# Patient Record
Sex: FEMALE | Race: ASIAN | NOT HISPANIC OR LATINO | Employment: OTHER | URBAN - METROPOLITAN AREA
[De-identification: names, ages, dates, MRNs, and addresses within clinical notes are randomized per-mention and may not be internally consistent; named-entity substitution may affect disease eponyms.]

---

## 2017-06-08 ENCOUNTER — HOSPITAL ENCOUNTER (INPATIENT)
Facility: HOSPITAL | Age: 72
LOS: 1 days | Discharge: HOME | DRG: 378 | End: 2017-06-10
Attending: EMERGENCY MEDICINE | Admitting: EMERGENCY MEDICINE
Payer: COMMERCIAL

## 2017-06-08 DIAGNOSIS — R52 PAIN: ICD-10-CM

## 2017-06-08 DIAGNOSIS — K92.2 ACUTE GI BLEEDING: Primary | ICD-10-CM

## 2017-06-08 DIAGNOSIS — D64.9 SYMPTOMATIC ANEMIA: ICD-10-CM

## 2017-06-08 DIAGNOSIS — N17.9 ACUTE ON CHRONIC RENAL FAILURE: ICD-10-CM

## 2017-06-08 DIAGNOSIS — I25.10 CORONARY ARTERY DISEASE WITHOUT ANGINA PECTORIS, UNSPECIFIED VESSEL OR LESION TYPE, UNSPECIFIED WHETHER NATIVE OR TRANSPLANTED HEART: ICD-10-CM

## 2017-06-08 DIAGNOSIS — N18.9 ACUTE ON CHRONIC RENAL FAILURE: ICD-10-CM

## 2017-06-08 DIAGNOSIS — E11.9 TYPE 2 DIABETES MELLITUS WITHOUT COMPLICATION, WITHOUT LONG-TERM CURRENT USE OF INSULIN: ICD-10-CM

## 2017-06-08 PROBLEM — E78.5 HLD (HYPERLIPIDEMIA): Status: ACTIVE | Noted: 2017-06-08

## 2017-06-08 PROBLEM — E07.9 THYROID DISEASE: Status: ACTIVE | Noted: 2017-06-08

## 2017-06-08 PROBLEM — I10 ESSENTIAL HYPERTENSION: Status: ACTIVE | Noted: 2017-06-08

## 2017-06-08 PROBLEM — N28.9 ACUTE ON CHRONIC RENAL INSUFFICIENCY: Status: ACTIVE | Noted: 2017-06-08

## 2017-06-08 LAB
ABO + RH BLD: NORMAL
ALBUMIN SERPL BCP-MCNC: 4 G/DL
ALP SERPL-CCNC: 25 U/L
ALT SERPL W/O P-5'-P-CCNC: 14 U/L
ANION GAP SERPL CALC-SCNC: 8 MMOL/L
APTT BLDCRRT: 26 SEC
AST SERPL-CCNC: 22 U/L
BASOPHILS # BLD AUTO: 0.01 K/UL
BASOPHILS NFR BLD: 0.3 %
BILIRUB SERPL-MCNC: 0.3 MG/DL
BILIRUB UR QL STRIP: NEGATIVE
BLD GP AB SCN CELLS X3 SERPL QL: NORMAL
BNP SERPL-MCNC: 45 PG/ML
BUN SERPL-MCNC: 70 MG/DL
CALCIUM SERPL-MCNC: 10 MG/DL
CHLORIDE SERPL-SCNC: 106 MMOL/L
CLARITY UR: CLEAR
CO2 SERPL-SCNC: 24 MMOL/L
COLOR UR: COLORLESS
CREAT SERPL-MCNC: 2.2 MG/DL
DIFFERENTIAL METHOD: ABNORMAL
EOSINOPHIL # BLD AUTO: 0.1 K/UL
EOSINOPHIL NFR BLD: 2.8 %
ERYTHROCYTE [DISTWIDTH] IN BLOOD BY AUTOMATED COUNT: 14.5 %
EST. GFR  (AFRICAN AMERICAN): 25 ML/MIN/1.73 M^2
EST. GFR  (NON AFRICAN AMERICAN): 22 ML/MIN/1.73 M^2
GLUCOSE SERPL-MCNC: 136 MG/DL
GLUCOSE UR QL STRIP: NEGATIVE
HCT VFR BLD AUTO: 23.7 %
HGB BLD-MCNC: 7.6 G/DL
HGB UR QL STRIP: NEGATIVE
INR PPP: 1
IRON SERPL-MCNC: 159 UG/DL
KETONES UR QL STRIP: NEGATIVE
LEUKOCYTE ESTERASE UR QL STRIP: NEGATIVE
LIPASE SERPL-CCNC: 75 U/L
LYMPHOCYTES # BLD AUTO: 0.9 K/UL
LYMPHOCYTES NFR BLD: 23.9 %
MCH RBC QN AUTO: 31.9 PG
MCHC RBC AUTO-ENTMCNC: 32.1 %
MCV RBC AUTO: 100 FL
MICROSCOPIC COMMENT: NORMAL
MONOCYTES # BLD AUTO: 0.4 K/UL
MONOCYTES NFR BLD: 8.9 %
NEUTROPHILS # BLD AUTO: 2.5 K/UL
NEUTROPHILS NFR BLD: 63.8 %
NITRITE UR QL STRIP: NEGATIVE
PH UR STRIP: 5 [PH] (ref 5–8)
PLATELET # BLD AUTO: 304 K/UL
PMV BLD AUTO: 10.3 FL
POCT GLUCOSE: 153 MG/DL (ref 70–110)
POCT GLUCOSE: 191 MG/DL (ref 70–110)
POTASSIUM SERPL-SCNC: 5.1 MMOL/L
PROT SERPL-MCNC: 7.5 G/DL
PROT UR QL STRIP: NEGATIVE
PROTHROMBIN TIME: 10.3 SEC
RBC # BLD AUTO: 2.38 M/UL
RBC #/AREA URNS HPF: 1 /HPF (ref 0–4)
RETICS/RBC NFR AUTO: 1.9 %
SATURATED IRON: 28 %
SODIUM SERPL-SCNC: 138 MMOL/L
SP GR UR STRIP: 1.01 (ref 1–1.03)
TOTAL IRON BINDING CAPACITY: 558 UG/DL
TRANSFERRIN SERPL-MCNC: 377 MG/DL
TROPONIN I SERPL DL<=0.01 NG/ML-MCNC: 0.01 NG/ML
TSH SERPL DL<=0.005 MIU/L-ACNC: 2.7 UIU/ML
URN SPEC COLLECT METH UR: ABNORMAL
UROBILINOGEN UR STRIP-ACNC: NEGATIVE EU/DL
WBC # BLD AUTO: 3.94 K/UL

## 2017-06-08 PROCEDURE — 84443 ASSAY THYROID STIM HORMONE: CPT

## 2017-06-08 PROCEDURE — 93005 ELECTROCARDIOGRAM TRACING: CPT

## 2017-06-08 PROCEDURE — 85014 HEMATOCRIT: CPT

## 2017-06-08 PROCEDURE — 85018 HEMOGLOBIN: CPT

## 2017-06-08 PROCEDURE — 83690 ASSAY OF LIPASE: CPT

## 2017-06-08 PROCEDURE — 85025 COMPLETE CBC W/AUTO DIFF WBC: CPT

## 2017-06-08 PROCEDURE — 25000003 PHARM REV CODE 250: Performed by: EMERGENCY MEDICINE

## 2017-06-08 PROCEDURE — 25000003 PHARM REV CODE 250: Performed by: PHYSICIAN ASSISTANT

## 2017-06-08 PROCEDURE — 83540 ASSAY OF IRON: CPT

## 2017-06-08 PROCEDURE — 85730 THROMBOPLASTIN TIME PARTIAL: CPT

## 2017-06-08 PROCEDURE — 83880 ASSAY OF NATRIURETIC PEPTIDE: CPT

## 2017-06-08 PROCEDURE — 63600175 PHARM REV CODE 636 W HCPCS: Performed by: EMERGENCY MEDICINE

## 2017-06-08 PROCEDURE — 82962 GLUCOSE BLOOD TEST: CPT

## 2017-06-08 PROCEDURE — 21400001 HC TELEMETRY ROOM

## 2017-06-08 PROCEDURE — 80053 COMPREHEN METABOLIC PANEL: CPT

## 2017-06-08 PROCEDURE — C9113 INJ PANTOPRAZOLE SODIUM, VIA: HCPCS | Performed by: EMERGENCY MEDICINE

## 2017-06-08 PROCEDURE — 96361 HYDRATE IV INFUSION ADD-ON: CPT

## 2017-06-08 PROCEDURE — 99284 EMERGENCY DEPT VISIT MOD MDM: CPT | Mod: 25

## 2017-06-08 PROCEDURE — 85610 PROTHROMBIN TIME: CPT

## 2017-06-08 PROCEDURE — 84484 ASSAY OF TROPONIN QUANT: CPT | Mod: 91

## 2017-06-08 PROCEDURE — 86920 COMPATIBILITY TEST SPIN: CPT

## 2017-06-08 PROCEDURE — 85045 AUTOMATED RETICULOCYTE COUNT: CPT

## 2017-06-08 PROCEDURE — 81000 URINALYSIS NONAUTO W/SCOPE: CPT

## 2017-06-08 PROCEDURE — 86900 BLOOD TYPING SEROLOGIC ABO: CPT

## 2017-06-08 PROCEDURE — G0378 HOSPITAL OBSERVATION PER HR: HCPCS

## 2017-06-08 PROCEDURE — 96374 THER/PROPH/DIAG INJ IV PUSH: CPT

## 2017-06-08 PROCEDURE — 86901 BLOOD TYPING SEROLOGIC RH(D): CPT

## 2017-06-08 PROCEDURE — 84484 ASSAY OF TROPONIN QUANT: CPT

## 2017-06-08 PROCEDURE — 36415 COLL VENOUS BLD VENIPUNCTURE: CPT

## 2017-06-08 RX ORDER — IBUPROFEN 200 MG
16 TABLET ORAL
Status: DISCONTINUED | OUTPATIENT
Start: 2017-06-08 | End: 2017-06-10 | Stop reason: HOSPADM

## 2017-06-08 RX ORDER — IBUPROFEN 200 MG
24 TABLET ORAL
Status: DISCONTINUED | OUTPATIENT
Start: 2017-06-08 | End: 2017-06-10 | Stop reason: HOSPADM

## 2017-06-08 RX ORDER — METOCLOPRAMIDE HYDROCHLORIDE 5 MG/ML
5 INJECTION INTRAMUSCULAR; INTRAVENOUS EVERY 6 HOURS PRN
Status: DISCONTINUED | OUTPATIENT
Start: 2017-06-08 | End: 2017-06-10 | Stop reason: HOSPADM

## 2017-06-08 RX ORDER — PANTOPRAZOLE SODIUM 40 MG/10ML
80 INJECTION, POWDER, LYOPHILIZED, FOR SOLUTION INTRAVENOUS
Status: COMPLETED | OUTPATIENT
Start: 2017-06-08 | End: 2017-06-08

## 2017-06-08 RX ORDER — GLUCAGON 1 MG
1 KIT INJECTION
Status: DISCONTINUED | OUTPATIENT
Start: 2017-06-08 | End: 2017-06-10 | Stop reason: HOSPADM

## 2017-06-08 RX ORDER — SODIUM CHLORIDE 9 MG/ML
INJECTION, SOLUTION INTRAVENOUS CONTINUOUS
Status: DISCONTINUED | OUTPATIENT
Start: 2017-06-08 | End: 2017-06-10 | Stop reason: HOSPADM

## 2017-06-08 RX ORDER — ASPIRIN 81 MG/1
81 TABLET ORAL DAILY
Status: DISCONTINUED | OUTPATIENT
Start: 2017-06-09 | End: 2017-06-10 | Stop reason: HOSPADM

## 2017-06-08 RX ORDER — LEVOTHYROXINE SODIUM 75 UG/1
75 TABLET ORAL DAILY
Status: DISCONTINUED | OUTPATIENT
Start: 2017-06-09 | End: 2017-06-10 | Stop reason: HOSPADM

## 2017-06-08 RX ORDER — CARVEDILOL 6.25 MG/1
6.25 TABLET ORAL 2 TIMES DAILY WITH MEALS
Status: DISCONTINUED | OUTPATIENT
Start: 2017-06-09 | End: 2017-06-10 | Stop reason: HOSPADM

## 2017-06-08 RX ORDER — INSULIN ASPART 100 [IU]/ML
0-5 INJECTION, SOLUTION INTRAVENOUS; SUBCUTANEOUS
Status: DISCONTINUED | OUTPATIENT
Start: 2017-06-08 | End: 2017-06-10 | Stop reason: HOSPADM

## 2017-06-08 RX ORDER — ONDANSETRON 2 MG/ML
4 INJECTION INTRAMUSCULAR; INTRAVENOUS EVERY 8 HOURS PRN
Status: DISCONTINUED | OUTPATIENT
Start: 2017-06-08 | End: 2017-06-10 | Stop reason: HOSPADM

## 2017-06-08 RX ORDER — FERROUS SULFATE 325(65) MG
325 TABLET, DELAYED RELEASE (ENTERIC COATED) ORAL DAILY
Status: DISCONTINUED | OUTPATIENT
Start: 2017-06-09 | End: 2017-06-10 | Stop reason: HOSPADM

## 2017-06-08 RX ORDER — ATORVASTATIN CALCIUM 40 MG/1
40 TABLET, FILM COATED ORAL DAILY
Status: DISCONTINUED | OUTPATIENT
Start: 2017-06-09 | End: 2017-06-10 | Stop reason: HOSPADM

## 2017-06-08 RX ORDER — PREGABALIN 150 MG/1
150 CAPSULE ORAL 2 TIMES DAILY
COMMUNITY

## 2017-06-08 RX ORDER — ACETAMINOPHEN 325 MG/1
650 TABLET ORAL EVERY 6 HOURS PRN
Status: DISCONTINUED | OUTPATIENT
Start: 2017-06-08 | End: 2017-06-10 | Stop reason: HOSPADM

## 2017-06-08 RX ADMIN — PANTOPRAZOLE SODIUM 80 MG: 40 INJECTION, POWDER, FOR SOLUTION INTRAVENOUS at 06:06

## 2017-06-08 RX ADMIN — SODIUM CHLORIDE: 0.9 INJECTION, SOLUTION INTRAVENOUS at 09:06

## 2017-06-08 RX ADMIN — SODIUM CHLORIDE 1000 ML: 0.9 INJECTION, SOLUTION INTRAVENOUS at 06:06

## 2017-06-08 RX ADMIN — DEXTROSE 8 MG/HR: 50 INJECTION, SOLUTION INTRAVENOUS at 11:06

## 2017-06-08 NOTE — ED TRIAGE NOTES
"Pt with dark colored recently - had a "hot flash" on Tuesday - then started yesterday with nausea, fatigue and dizziness - pt on iron supplements  "

## 2017-06-08 NOTE — ED PROVIDER NOTES
"Encounter Date: 6/8/2017    SCRIBE #1 NOTE: I, Melendez Teodora , am scribing for, and in the presence of,  Oral Dumont MD. I have scribed the following portions of the note - Other sections scribed: HPI, ROS.       History     Chief Complaint   Patient presents with    Fatigue     General fatigue, poor appetite X 3 days. Having hot flashes. Family states she is off her diabetic meds because it was lowering her blood sugar too much and her Dr. D/C'd it     Review of patient's allergies indicates:  No Known Allergies  CC: Fatigue; Dark Stool     HPI: This 71 y.o. female with a medical history of Anticoagulant long-term use; Diabetes mellitus; High cholesterol; Hypertension; and Thyroid disease presents to the ED c/o multiple medical complaints. Pt is c/o generalized fatigue, appetite change, and nausea that began 1 year ago with acutely worsening symptoms and hot flashes in the last week. Pt states, "I went to my doctor and was told I am losing blood. Afterwards, I began feeling very tired."  Pt is c/o 9x episodes of dark stool within the last year. Per family, pt is off her DM medication with approval from her PCP due to lowering her blood sugar too much. Pt is unsure if she has had a colonoscopy exam in the past. Denies sore throat, shortness of breath, dysuria, or any other associated symptoms. Pt has no modifying factors. Pt has no prior treatment.       The history is provided by the patient. The history is limited by a language barrier (Japanese). A  was used (Benjamin).     Past Medical History:   Diagnosis Date    Anticoagulant long-term use     Diabetes mellitus     Encounter for blood transfusion     High cholesterol     Hypertension     Thyroid disease      Past Surgical History:   Procedure Laterality Date    APPENDECTOMY      CORONARY ARTERY BYPASS GRAFT       History reviewed. No pertinent family history.  Social History   Substance Use Topics    Smoking status: Former " Smoker     Start date: 1/8/2002    Smokeless tobacco: Not on file    Alcohol use No     Review of Systems   Constitutional: Positive for appetite change and fatigue.   HENT: Negative for sore throat.    Respiratory: Negative for shortness of breath.    Cardiovascular: Negative for chest pain.   Gastrointestinal: Positive for nausea.        (+) 9x episodes of Dark Stool   Genitourinary: Negative for dysuria.   Musculoskeletal: Negative for back pain.   Skin: Negative for rash.   Neurological: Negative for weakness.   Hematological: Does not bruise/bleed easily.   All other systems reviewed and are negative.      Physical Exam     Initial Vitals [06/08/17 1545]   BP Pulse Resp Temp SpO2   (!) 103/54 84 16 98 °F (36.7 °C) 98 %     Physical Exam  Nursing note and vitals reviewed.  Constitutional: Nontoxic appearing elderly female in no obvious distress  HENT:    Head: NC/AT    Eyes: Conjunctivae normal.  (-) scleral icterus.              Mouth/Throat: Dry mucosal membranes   Neck: Neck supple, normal rom.  Cardiovascular: RRR   Pulmonary/Chest: CTAB   Abdomen:  Soft, ND/NT.  (-) CVA tenderness.  Musculoskeletal:  FROM of all major joints. No apparent edema or tenderness.  Neurological: A&O x4.  No acute focal motor deficits.    Skin: Skin is intact, warm and dry.    Psychiatric: normal mood and affect.      ED Course   Procedures  Labs Reviewed   CBC W/ AUTO DIFFERENTIAL - Abnormal; Notable for the following:        Result Value    RBC 2.38 (*)     Hemoglobin 7.6 (*)     Hematocrit 23.7 (*)      (*)     MCH 31.9 (*)     Lymph # 0.9 (*)     All other components within normal limits   COMPREHENSIVE METABOLIC PANEL - Abnormal; Notable for the following:     Glucose 136 (*)     BUN, Bld 70 (*)     Creatinine 2.2 (*)     Alkaline Phosphatase 25 (*)     eGFR if  25 (*)     eGFR if non  22 (*)     All other components within normal limits   LIPASE - Abnormal; Notable for the following:      Lipase 75 (*)     All other components within normal limits   URINALYSIS - Abnormal; Notable for the following:     Color, UA Colorless (*)     All other components within normal limits   IRON AND TIBC - Abnormal; Notable for the following:     Transferrin 377 (*)     TIBC 558 (*)     All other components within normal limits   POCT GLUCOSE - Abnormal; Notable for the following:     POCT Glucose 191 (*)     All other components within normal limits   POCT GLUCOSE - Abnormal; Notable for the following:     POCT Glucose 153 (*)     All other components within normal limits   TROPONIN I   B-TYPE NATRIURETIC PEPTIDE   PROTIME-INR   APTT   TSH   RETICULOCYTES   URINALYSIS MICROSCOPIC   HEMOGLOBIN A1C   OCCULT BLOOD X 1, STOOL   TYPE & SCREEN   POCT GLUCOSE MONITORING CONTINUOUS     EKG Readings: (Independently Interpreted)   Initial Reading: No STEMI.   Normal sinus rhythm, rate 78, normal axis/intervals, no ST/T-wave abnormalities.       X-Rays:   Independently Interpreted Readings:   Chest X-Ray: Normal heart size.  No infiltrates.  No acute abnormalities.     Medical Decision Making:   History:   I obtained history from: someone other than patient.  Old Medical Records: I decided to obtain old medical records.  Old Records Summarized: records from clinic visits and other records.  Independently Interpreted Test(s):   I have ordered and independently interpreted X-rays - see prior notes.  I have ordered and independently interpreted EKG Reading(s) - see prior notes  Clinical Tests:   Lab Tests: Ordered and Reviewed  Radiological Study: Ordered and Reviewed  Medical Tests: Ordered and Reviewed    Differential Diagnosis:   Initial differential diagnosis includes but is not limited to... coagulopathy, lower gi bleed including hemorrhoids (internal vs external), anal fissure, diverticulosis, diverticulitis, AV malformation; upper GI bleeding including gastritis, peptic ulcer disease, esophageal varices.     Additional  Medical Decision Making:   Emergent evaluation of a 71-year-old female with history of diabetes, dyslipidemia, hypothyroidism, and hypertension who presents the emergency department complaining of dark brown/black stool and generalized weakness for the past 3 days.  On exam, she appears well and in no obvious distress.  Her abdomen is soft and nontender.  TINO significant for Hemoccult-positive brown stool.  VS stable.  H&H 7.6/23.7.  Platelet count 304.  Normal coags.  Findings at this time are concerning for likely upper GI bleed.  She has been started on IV Protonix and will be admitted to medicine for further evaluation and management including serial H&Hs as well as urgent GI consultation.              Scribe Attestation:   Scribe #1: I performed the above scribed service and the documentation accurately describes the services I performed. I attest to the accuracy of the note.    Attending Attestation:           Physician Attestation for Scribe:  Physician Attestation Statement for Scribe #1: I, Oral Dumont MD, reviewed documentation, as scribed by Al Araiza in my presence, and it is both accurate and complete.                 ED Course     Clinical Impression:   The primary encounter diagnosis was Acute GI bleeding. Diagnoses of Pain, Symptomatic anemia, and Acute on chronic renal failure were also pertinent to this visit.    Disposition:   Disposition: Admitted       Oral Dumont MD  06/09/17 025

## 2017-06-09 ENCOUNTER — ANESTHESIA (OUTPATIENT)
Dept: ENDOSCOPY | Facility: HOSPITAL | Age: 72
DRG: 378 | End: 2017-06-09
Payer: COMMERCIAL

## 2017-06-09 ENCOUNTER — SURGERY (OUTPATIENT)
Age: 72
End: 2017-06-09

## 2017-06-09 ENCOUNTER — ANESTHESIA EVENT (OUTPATIENT)
Dept: ENDOSCOPY | Facility: HOSPITAL | Age: 72
DRG: 378 | End: 2017-06-09
Payer: COMMERCIAL

## 2017-06-09 LAB
ALBUMIN SERPL BCP-MCNC: 3.3 G/DL
ALP SERPL-CCNC: 18 U/L
ALT SERPL W/O P-5'-P-CCNC: 11 U/L
ANION GAP SERPL CALC-SCNC: 7 MMOL/L
AST SERPL-CCNC: 17 U/L
BASOPHILS # BLD AUTO: 0.01 K/UL
BASOPHILS NFR BLD: 0.3 %
BILIRUB SERPL-MCNC: 0.3 MG/DL
BUN SERPL-MCNC: 51 MG/DL
CALCIUM SERPL-MCNC: 8.7 MG/DL
CHLORIDE SERPL-SCNC: 111 MMOL/L
CO2 SERPL-SCNC: 24 MMOL/L
CREAT SERPL-MCNC: 1.8 MG/DL
DIFFERENTIAL METHOD: ABNORMAL
EOSINOPHIL # BLD AUTO: 0.1 K/UL
EOSINOPHIL NFR BLD: 4.6 %
ERYTHROCYTE [DISTWIDTH] IN BLOOD BY AUTOMATED COUNT: 14.3 %
EST. GFR  (AFRICAN AMERICAN): 32 ML/MIN/1.73 M^2
EST. GFR  (NON AFRICAN AMERICAN): 28 ML/MIN/1.73 M^2
GLUCOSE SERPL-MCNC: 91 MG/DL
HCT VFR BLD AUTO: 20.1 %
HCT VFR BLD AUTO: 21.9 %
HCT VFR BLD AUTO: 22.4 %
HCT VFR BLD AUTO: 30.9 %
HGB BLD-MCNC: 10.5 G/DL
HGB BLD-MCNC: 6.6 G/DL
HGB BLD-MCNC: 7.1 G/DL
HGB BLD-MCNC: 7.2 G/DL
INR PPP: 1
LYMPHOCYTES # BLD AUTO: 1.1 K/UL
LYMPHOCYTES NFR BLD: 35.6 %
MCH RBC QN AUTO: 32.1 PG
MCHC RBC AUTO-ENTMCNC: 32.4 %
MCV RBC AUTO: 99 FL
MONOCYTES # BLD AUTO: 0.5 K/UL
MONOCYTES NFR BLD: 15 %
NEUTROPHILS # BLD AUTO: 1.4 K/UL
NEUTROPHILS NFR BLD: 44.2 %
PLATELET # BLD AUTO: 269 K/UL
PMV BLD AUTO: 10.2 FL
POCT GLUCOSE: 113 MG/DL (ref 70–110)
POCT GLUCOSE: 119 MG/DL (ref 70–110)
POCT GLUCOSE: 130 MG/DL (ref 70–110)
POCT GLUCOSE: 152 MG/DL (ref 70–110)
POTASSIUM SERPL-SCNC: 4.1 MMOL/L
PROT SERPL-MCNC: 6 G/DL
PROTHROMBIN TIME: 10.6 SEC
RBC # BLD AUTO: 2.21 M/UL
SODIUM SERPL-SCNC: 142 MMOL/L
TROPONIN I SERPL DL<=0.01 NG/ML-MCNC: 0.01 NG/ML
TROPONIN I SERPL DL<=0.01 NG/ML-MCNC: 0.01 NG/ML
WBC # BLD AUTO: 3.06 K/UL

## 2017-06-09 PROCEDURE — 88305 TISSUE EXAM BY PATHOLOGIST: CPT | Performed by: PATHOLOGY

## 2017-06-09 PROCEDURE — 88342 IMHCHEM/IMCYTCHM 1ST ANTB: CPT | Mod: 26,,, | Performed by: PATHOLOGY

## 2017-06-09 PROCEDURE — 21400001 HC TELEMETRY ROOM

## 2017-06-09 PROCEDURE — 25000003 PHARM REV CODE 250: Performed by: PHYSICIAN ASSISTANT

## 2017-06-09 PROCEDURE — 85014 HEMATOCRIT: CPT | Mod: 91

## 2017-06-09 PROCEDURE — 37000009 HC ANESTHESIA EA ADD 15 MINS: Performed by: INTERNAL MEDICINE

## 2017-06-09 PROCEDURE — C9113 INJ PANTOPRAZOLE SODIUM, VIA: HCPCS | Performed by: INTERNAL MEDICINE

## 2017-06-09 PROCEDURE — 85018 HEMOGLOBIN: CPT | Mod: 91

## 2017-06-09 PROCEDURE — 25000003 PHARM REV CODE 250: Performed by: EMERGENCY MEDICINE

## 2017-06-09 PROCEDURE — 36430 TRANSFUSION BLD/BLD COMPNT: CPT

## 2017-06-09 PROCEDURE — 88305 TISSUE EXAM BY PATHOLOGIST: CPT | Mod: 26,,, | Performed by: PATHOLOGY

## 2017-06-09 PROCEDURE — 37000008 HC ANESTHESIA 1ST 15 MINUTES: Performed by: INTERNAL MEDICINE

## 2017-06-09 PROCEDURE — 43239 EGD BIOPSY SINGLE/MULTIPLE: CPT | Performed by: INTERNAL MEDICINE

## 2017-06-09 PROCEDURE — 85025 COMPLETE CBC W/AUTO DIFF WBC: CPT

## 2017-06-09 PROCEDURE — 25000003 PHARM REV CODE 250: Performed by: NURSE ANESTHETIST, CERTIFIED REGISTERED

## 2017-06-09 PROCEDURE — 80053 COMPREHEN METABOLIC PANEL: CPT

## 2017-06-09 PROCEDURE — P9021 RED BLOOD CELLS UNIT: HCPCS

## 2017-06-09 PROCEDURE — 36415 COLL VENOUS BLD VENIPUNCTURE: CPT

## 2017-06-09 PROCEDURE — 82962 GLUCOSE BLOOD TEST: CPT

## 2017-06-09 PROCEDURE — 63600175 PHARM REV CODE 636 W HCPCS: Performed by: NURSE ANESTHETIST, CERTIFIED REGISTERED

## 2017-06-09 PROCEDURE — 63600175 PHARM REV CODE 636 W HCPCS: Performed by: EMERGENCY MEDICINE

## 2017-06-09 PROCEDURE — D9220A PRA ANESTHESIA: Mod: ,,, | Performed by: ANESTHESIOLOGY

## 2017-06-09 PROCEDURE — 63600175 PHARM REV CODE 636 W HCPCS: Performed by: INTERNAL MEDICINE

## 2017-06-09 PROCEDURE — 27201012 HC FORCEPS, HOT/COLD, DISP: Performed by: INTERNAL MEDICINE

## 2017-06-09 PROCEDURE — 85610 PROTHROMBIN TIME: CPT

## 2017-06-09 PROCEDURE — 84484 ASSAY OF TROPONIN QUANT: CPT

## 2017-06-09 PROCEDURE — 0DB68ZX EXCISION OF STOMACH, VIA NATURAL OR ARTIFICIAL OPENING ENDOSCOPIC, DIAGNOSTIC: ICD-10-PCS | Performed by: INTERNAL MEDICINE

## 2017-06-09 PROCEDURE — C9113 INJ PANTOPRAZOLE SODIUM, VIA: HCPCS | Performed by: EMERGENCY MEDICINE

## 2017-06-09 RX ORDER — LIDOCAINE HYDROCHLORIDE 20 MG/ML
INJECTION, SOLUTION EPIDURAL; INFILTRATION; INTRACAUDAL; PERINEURAL
Status: DISPENSED
Start: 2017-06-09 | End: 2017-06-10

## 2017-06-09 RX ORDER — PANTOPRAZOLE SODIUM 40 MG/10ML
40 INJECTION, POWDER, LYOPHILIZED, FOR SOLUTION INTRAVENOUS DAILY
Status: DISCONTINUED | OUTPATIENT
Start: 2017-06-09 | End: 2017-06-10

## 2017-06-09 RX ORDER — LIDOCAINE HCL/PF 100 MG/5ML
SYRINGE (ML) INTRAVENOUS
Status: DISCONTINUED | OUTPATIENT
Start: 2017-06-09 | End: 2017-06-09

## 2017-06-09 RX ORDER — PROPOFOL 10 MG/ML
VIAL (ML) INTRAVENOUS
Status: DISPENSED
Start: 2017-06-09 | End: 2017-06-10

## 2017-06-09 RX ORDER — HYDROCODONE BITARTRATE AND ACETAMINOPHEN 500; 5 MG/1; MG/1
TABLET ORAL
Status: DISCONTINUED | OUTPATIENT
Start: 2017-06-09 | End: 2017-06-10 | Stop reason: HOSPADM

## 2017-06-09 RX ORDER — PROPOFOL 10 MG/ML
VIAL (ML) INTRAVENOUS
Status: DISCONTINUED | OUTPATIENT
Start: 2017-06-09 | End: 2017-06-09

## 2017-06-09 RX ADMIN — CARVEDILOL 6.25 MG: 6.25 TABLET, FILM COATED ORAL at 05:06

## 2017-06-09 RX ADMIN — LIDOCAINE HYDROCHLORIDE 60 MG: 20 INJECTION, SOLUTION INTRAVENOUS at 02:06

## 2017-06-09 RX ADMIN — PROPOFOL 20 MG: 10 INJECTION, EMULSION INTRAVENOUS at 02:06

## 2017-06-09 RX ADMIN — PROPOFOL 50 MG: 10 INJECTION, EMULSION INTRAVENOUS at 02:06

## 2017-06-09 RX ADMIN — ASPIRIN 81 MG: 81 TABLET, COATED ORAL at 09:06

## 2017-06-09 RX ADMIN — DEXTROSE 8 MG/HR: 50 INJECTION, SOLUTION INTRAVENOUS at 09:06

## 2017-06-09 RX ADMIN — FERROUS SULFATE TAB EC 325 MG (65 MG FE EQUIVALENT) 325 MG: 325 (65 FE) TABLET DELAYED RESPONSE at 09:06

## 2017-06-09 RX ADMIN — SODIUM CHLORIDE: 0.9 INJECTION, SOLUTION INTRAVENOUS at 09:06

## 2017-06-09 RX ADMIN — LEVOTHYROXINE SODIUM 75 MCG: 75 TABLET ORAL at 06:06

## 2017-06-09 RX ADMIN — PANTOPRAZOLE SODIUM 40 MG: 40 INJECTION, POWDER, FOR SOLUTION INTRAVENOUS at 05:06

## 2017-06-09 RX ADMIN — ATORVASTATIN CALCIUM 40 MG: 40 TABLET, FILM COATED ORAL at 09:06

## 2017-06-09 RX ADMIN — SODIUM CHLORIDE: 0.9 INJECTION, SOLUTION INTRAVENOUS at 08:06

## 2017-06-09 RX ADMIN — DEXTROSE 8 MG/HR: 50 INJECTION, SOLUTION INTRAVENOUS at 02:06

## 2017-06-09 NOTE — SUBJECTIVE & OBJECTIVE
Past Medical History:   Diagnosis Date    Anticoagulant long-term use     Diabetes mellitus     High cholesterol     Hypertension     Thyroid disease        History reviewed. No pertinent surgical history.    Review of patient's allergies indicates:  No Known Allergies    No current facility-administered medications on file prior to encounter.      Current Outpatient Prescriptions on File Prior to Encounter   Medication Sig    amlodipine (NORVASC) 10 MG tablet Take 10 mg by mouth once daily.    aspirin (ECOTRIN) 81 MG EC tablet Take 81 mg by mouth once daily.    atorvastatin (LIPITOR) 40 MG tablet Take 40 mg by mouth once daily.    carvedilol (COREG) 6.25 MG tablet Take 6.25 mg by mouth 2 (two) times daily with meals.    clopidogrel (PLAVIX) 75 mg tablet Take 75 mg by mouth once daily.    famotidine (PEPCID) 20 MG tablet Take 20 mg by mouth once daily.     furosemide (LASIX) 20 MG tablet Take 1 tablet (20 mg total) by mouth once daily.    levothyroxine (SYNTHROID) 75 MCG tablet Take 75 mcg by mouth once daily.    lisinopril 10 MG tablet Take 10 mg by mouth once daily.    pioglitazone (ACTOS) 30 MG tablet Take 30 mg by mouth once daily.    potassium chloride (KLOR-CON) 20 mEq Pack Take 10 mEq by mouth once daily.     alendronate (FOSAMAX) 70 MG tablet Take 70 mg by mouth every 7 days.    fenofibrate (TRICOR) 145 MG tablet Take 145 mg by mouth once daily.    ferrous sulfate 325 mg (65 mg iron) Tab tablet Take 1 tablet (325 mg total) by mouth once daily.    gabapentin (NEURONTIN) 100 MG capsule Take 100 mg by mouth 3 (three) times daily.    metformin (GLUCOPHAGE) 850 MG tablet Take 850 mg by mouth 2 (two) times daily with meals.    pravastatin (PRAVACHOL) 80 MG tablet Take 80 mg by mouth once daily.    sennosides (SENNA) 8.6 mg Cap Take 1 capsule by mouth once daily.     Family History     None        Social History Main Topics    Smoking status: Unknown If Ever Smoked    Smokeless tobacco: Not  on file    Alcohol use No    Drug use: Unknown    Sexual activity: Not on file     Review of Systems   Constitutional: Positive for appetite change and fatigue. Negative for chills and fever.   HENT: Negative for congestion and sore throat.    Respiratory: Negative for cough and shortness of breath.    Cardiovascular: Negative for chest pain and palpitations.   Gastrointestinal: Positive for blood in stool. Negative for abdominal pain and nausea.   Endocrine: Negative for cold intolerance and heat intolerance.   Genitourinary: Negative for dysuria and frequency.   Musculoskeletal: Negative for arthralgias and myalgias.   Skin: Negative for color change and rash.   Neurological: Positive for weakness and light-headedness. Negative for dizziness and headaches.   Psychiatric/Behavioral: Negative for behavioral problems and confusion.     Objective:     Vital Signs (Most Recent):  Temp: 98 °F (36.7 °C) (06/08/17 1902)  Pulse: 72 (06/08/17 2102)  Resp: 16 (06/08/17 1902)  BP: (!) 120/59 (06/08/17 2102)  SpO2: 100 % (06/08/17 2102) Vital Signs (24h Range):  Temp:  [97.8 °F (36.6 °C)-98 °F (36.7 °C)] 98 °F (36.7 °C)  Pulse:  [70-84] 72  Resp:  [16] 16  SpO2:  [98 %-100 %] 100 %  BP: (103-137)/(53-64) 120/59     Weight: 54.4 kg (120 lb)  Body mass index is 20.6 kg/m².    Physical Exam   Constitutional: She is oriented to person, place, and time. She appears well-developed and well-nourished. No distress.   HENT:   Head: Normocephalic and atraumatic.   Eyes: EOM are normal. Pupils are equal, round, and reactive to light.   Pale conjunctiva   Neck: Normal range of motion. Neck supple.   Cardiovascular: Normal rate and regular rhythm.    No murmur heard.  Pulmonary/Chest: Effort normal and breath sounds normal.   Abdominal: Soft. Bowel sounds are normal. There is no tenderness.   Musculoskeletal: Normal range of motion.   Neurological: She is alert and oriented to person, place, and time.   Skin: Skin is warm and dry. No  rash noted.   Psychiatric: She has a normal mood and affect. Her behavior is normal.        Significant Labs:   CBC:   Recent Labs  Lab 06/08/17  1700   WBC 3.94   HGB 7.6*   HCT 23.7*        CMP:   Recent Labs  Lab 06/08/17  1700      K 5.1      CO2 24   *   BUN 70*   CREATININE 2.2*   CALCIUM 10.0   PROT 7.5   ALBUMIN 4.0   BILITOT 0.3   ALKPHOS 25*   AST 22   ALT 14   ANIONGAP 8   EGFRNONAA 22*     Urine Studies:   Recent Labs  Lab 06/08/17  1800   COLORU Colorless*   APPEARANCEUA Clear   PHUR 5.0   SPECGRAV 1.010   PROTEINUA Negative   GLUCUA Negative   KETONESU Negative   BILIRUBINUA Negative   OCCULTUA Negative   NITRITE Negative   UROBILINOGEN Negative   LEUKOCYTESUR Negative   RBCUA 1

## 2017-06-09 NOTE — ASSESSMENT & PLAN NOTE
-Monitor serial H/H.   -Awaiting Consult to GI  -Transfuse 3 units PRBC  -IV fluids  -iron daily

## 2017-06-09 NOTE — HOSPITAL COURSE
Admitted for weakness and worsening fatigue and found to be anemic with H/H trending downward 6.6/20.1 this morning. Patient consented via translation line for 3 units PRBC, risk and benefits discussed and agreed by patient. She tells me she is familiar with blood transfusions as she has had many before but unsure of the date of the last transfusion.     Dc h/h 12/35  EGD findings: Impression:          - Normal esophagus.                       - Normal examined duodenum.                       - Gastric erosions without bleeding. Biopsied.    Pt instructed to start protonix daily.  Per son she plans to follow up with GI in [place], recommend pill endoscopy. Taken off aspirin and will continue plavix for CAD. Hold metformin. Lower lisinopril and dc norvasc. Changes reviewed with son.

## 2017-06-09 NOTE — ASSESSMENT & PLAN NOTE
Per patient visit 1 year ago seems likely some baseline renal disease although patient is unsure of this. She seems to have acute insufficiency at this time noting creatinine of 2.2 on today and was 1.6 last year. Will provide IVF hydration and repeat BMP in am.

## 2017-06-09 NOTE — ASSESSMENT & PLAN NOTE
CAD s/p CABG. Currently on ASA, plavix, statin it seems although patient unsure what medications she is actually taking. Continue.

## 2017-06-09 NOTE — ED NOTES
Patient's son given permission to give the patient food from home, rice and vegetables. Patient's son instructed that the patient cannot eat after midnight.

## 2017-06-09 NOTE — ANESTHESIA PREPROCEDURE EVALUATION
06/09/2017  Radha Freedman is a 71 y.o., female.    Anesthesia Evaluation    I have reviewed the Patient Summary Reports.     I have reviewed the Medications.     Review of Systems  Anesthesia Hx:  No problems with previous Anesthesia    Social:  Former Smoker, No Alcohol Use    Cardiovascular:   Hypertension CAD  CABG/stent  hyperlipidemia    Renal/:   Chronic Renal Disease, CRI, ARF    Endocrine:   Diabetes Hypothyroidism        Physical Exam  General:  Well nourished    Airway/Jaw/Neck:  Airway Findings: Mouth Opening: Normal Tongue: Normal  General Airway Assessment: Adult  Mallampati: III  TM Distance: Normal, at least 6 cm  Jaw/Neck Findings:  Neck ROM: Normal ROM      Dental:  Dental Findings: In tact   Chest/Lungs:  Chest/Lungs Findings: Clear to auscultation, Normal Respiratory Rate     Heart/Vascular:  Heart Findings: Rate: Normal        Mental Status:  Mental Status Findings:  Cooperative, Alert and Oriented         Anesthesia Plan  Type of Anesthesia, risks & benefits discussed:  Anesthesia Type:  general  Patient's Preference:   Intra-op Monitoring Plan: standard ASA monitors  Intra-op Monitoring Plan Comments:   Post Op Pain Control Plan:   Post Op Pain Control Plan Comments:   Induction:   IV  Beta Blocker:  Patient is on a Beta-Blocker and has not received dose within the past 24 hours due to non-compliance or for other reasons (Patient should receive a perioperative dose or document why it is withheld). Perioperative Beta Blocker not given due to: Hypotension on presentation      Informed Consent: Patient understands risks and agrees with Anesthesia plan.  Questions answered. Anesthesia consent signed with patient.  ASA Score: 3     Day of Surgery Review of History & Physical:    H&P update referred to the surgeon.         Ready For Surgery From Anesthesia Perspective.

## 2017-06-09 NOTE — ASSESSMENT & PLAN NOTE
CAD s/p CABG. Currently on ASA, plavix, statin it seems although patient unsure what medications she is actually taking.  -Continue ASA & Plavix and defer to GI

## 2017-06-09 NOTE — PLAN OF CARE
Problem: Patient Care Overview  Goal: Plan of Care Review   06/08/17 7268   Coping/Psychosocial   Plan Of Care Reviewed With patient;son   Pt remained free of falls during current shift. Denied pain and did not receive any prn pain medications. Pt remained NPO since midnight and GI consulted. IV fluids and IV Protonix infusing per MD order. No signs or symptoms of bleeding present and monitoring for signs/ symptoms of bleeding. Plan of care and fall precautions reviewed with pt and verbalized understanding. Bed locked, lowered, SR up x2 and call light placed within reach.

## 2017-06-09 NOTE — ASSESSMENT & PLAN NOTE
Patient seems to have underlying chronic anemia --iron deficiency vs chronic disease? As she has iron pills in bag but she is unsure of this as well. Last year hemoglobin of 8.1 and today at 7.6 with reported chronic intermittent dark stools? She reports fatigue and SOB but currently vitals stable and no indication for transfusion. She does report having had 2 units of blood in [place] a few months ago. Will monitor with serial H/H and anemia workup pending.

## 2017-06-09 NOTE — HPI
"Patient is 72 yo Yakut speaking female with HTN, HLD, CAD s/p CABG (10 years ago), DM II, CKD?, thyroid disease who presents with complaint of fatigue and dark stools. History obtained with  but patient is poor historian and no family members at bedside. Per patient she has been experiencing about one year of progressively worsening fatigue, decreased appetite, and intermittent dark stools. Per patient she experienced three dark stools this week and had a similar episode a few months ago in [place] for which she received "2 bags of blood." She is unclear on if she has ever had a colonoscopy. She denies recent illness, fever/chills, abdominal pain, hematemesis, hematuria. She does not know much about her medical history or medications. She states she lives in [place] and is here visiting family with plans to return home soon. On presentation patient with hypotension and clinically dehydrated. Otherwise stable vitals. Grossly normal labs outside of hemoglobin of 7.6 (only down from 8.1 last year). Placed in observation for serial H/H and further monitoring.   "

## 2017-06-09 NOTE — PLAN OF CARE
06/09/17 1606   Discharge Assessment   Assessment Type Discharge Planning Assessment   Confirmed/corrected address and phone number on facesheet? Yes   Assessment information obtained from? Patient   Expected Length of Stay (days) 2   Communicated expected length of stay with patient/caregiver yes   Prior to hospitilization cognitive status: Alert/Oriented   Prior to hospitalization functional status: Independent   Current cognitive status: Alert/Oriented   Current Functional Status: Independent   Arrived From home or self-care   Lives With child(rhona), adult   Able to Return to Prior Arrangements yes   Is patient able to care for self after discharge? Unable to determine at this time (comments)   Who are your caregiver(s) and their phone number(s)? Multi family members able to help at home if needed   Patient's perception of discharge disposition home or selfcare   Readmission Within The Last 30 Days no previous admission in last 30 days   Patient currently being followed by outpatient case management? No   Patient currently receives home health services? No   Does the patient currently use HME? No   Equipment Currently Used at Home none   Do you have any problems affording any of your prescribed medications? No   Is the patient taking medications as prescribed? yes   Does the patient have transportation to healthcare appointments? Yes   Transportation Available family or friend will provide   On Dialysis? No   Does the patient receive services at the Coumadin Clinic? No   Discharge Plan A Home with family   Patient/Family In Agreement With Plan yes   Above information obtained from patient at bedside using language line in presences of ANDI Mclean.    TN to patient's room to discuss Helping the patient manage care at home.   TN/SW roll explained to pt.  Teach back method used.  TN's name and contact info placed on white board.  Pt/family encouraged to call for any problems/concerns with DC.

## 2017-06-09 NOTE — SUBJECTIVE & OBJECTIVE
Interval History: Patient weak, fatigued appearing, tells me she has had transfusions before; endorses that she does not eat meat or much with iron nutritionally; denies overt bleeding, denies dark stools, denies vaginal bleeding, or NV    Review of Systems   Constitutional: Positive for appetite change and fatigue. Negative for chills, diaphoresis and fever.   Respiratory: Positive for shortness of breath. Negative for cough, chest tightness and wheezing.    Cardiovascular: Negative for chest pain, palpitations and leg swelling.   Gastrointestinal: Negative for abdominal distention, abdominal pain, constipation, diarrhea, nausea and vomiting.   Genitourinary: Negative for dysuria and hematuria.   Musculoskeletal: Negative for joint swelling and neck stiffness.   Neurological: Positive for dizziness and weakness.     Objective:     Vital Signs (Most Recent):  Temp: 98 °F (36.7 °C) (06/09/17 1130)  Pulse: 66 (06/09/17 1130)  Resp: 17 (06/09/17 1130)  BP: (!) 138/56 (06/09/17 1130)  SpO2: 98 % (06/09/17 1130) Vital Signs (24h Range):  Temp:  [97.7 °F (36.5 °C)-98 °F (36.7 °C)] 98 °F (36.7 °C)  Pulse:  [65-84] 66  Resp:  [16-17] 17  SpO2:  [97 %-100 %] 98 %  BP: (103-148)/(50-64) 138/56     Weight: 58.5 kg (129 lb)  Body mass index is 22.14 kg/m².  No intake or output data in the 24 hours ending 06/09/17 1207   Physical Exam   Constitutional: She is oriented to person, place, and time. She appears well-developed and well-nourished. No distress.   Weak and dehydrated appearing; denies recent weight changes;   HENT:   Head: Normocephalic and atraumatic.   Pale conjunctiva   Eyes: EOM are normal. Pupils are equal, round, and reactive to light. Right eye exhibits no discharge. Left eye exhibits no discharge. No scleral icterus.   Cardiovascular: Normal rate and regular rhythm.    Sluggish cap refill   Pulmonary/Chest: Effort normal. No respiratory distress. She has no wheezes. She has no rales.   Abdominal: Soft. She  exhibits no distension. There is no tenderness.   Genitourinary: Rectal exam shows guaiac negative stool.   Musculoskeletal: Normal range of motion. She exhibits no edema or tenderness.   Neurological: She is alert and oriented to person, place, and time.   Skin: Skin is warm and dry. She is not diaphoretic.   Psychiatric:   Cambodian speaking       Significant Labs:   CBC:   Recent Labs  Lab 06/08/17  1700 06/08/17  2343 06/09/17  0518 06/09/17  0937   WBC 3.94  --  3.06*  --    HGB 7.6* 7.2* 7.1* 6.6*   HCT 23.7* 22.4* 21.9* 20.1*     --  269  --      CMP:   Recent Labs  Lab 06/08/17  1700 06/09/17  0519    142   K 5.1 4.1    111*   CO2 24 24   * 91   BUN 70* 51*   CREATININE 2.2* 1.8*   CALCIUM 10.0 8.7   PROT 7.5 6.0   ALBUMIN 4.0 3.3*   BILITOT 0.3 0.3   ALKPHOS 25* 18*   AST 22 17   ALT 14 11   ANIONGAP 8 7*   EGFRNONAA 22* 28*     Lactic Acid: No results for input(s): LACTATE in the last 48 hours.  Lipase:   Recent Labs  Lab 06/08/17  1700   LIPASE 75*     Lipid Panel: No results for input(s): CHOL, HDL, LDLCALC, TRIG, CHOLHDL in the last 48 hours.  Troponin:   Recent Labs  Lab 06/08/17  1700 06/08/17  2343 06/09/17  0519   TROPONINI 0.012 0.009 0.011     TSH:   Recent Labs  Lab 06/08/17  1800   TSH 2.696     Urine Culture: No results for input(s): LABURIN in the last 48 hours.    Magnesium:  No results for input(s): MG in the last 48 hours.    Significant Imaging:   Imaging Results          X-Ray Chest PA And Lateral (Final result)  Result time 06/08/17 16:59:33    Final result by Sven Sloan MD (06/08/17 16:59:33)                 Impression:     stable chest.      Electronically signed by: RONDA SLOAN MD  Date:     06/08/17  Time:    16:59              Narrative:    2 views of chest.  Comparison 2015.  Postop sternotomy.  Heart normal size.  Prominent epicardial fat pads stable.  Postop cholecystectomy.  Lungs clear.

## 2017-06-09 NOTE — CONSULTS
"Gastroenterology    CC: Anemia/dark stool    HPI 71 y.o. female with moderate severity, painless, persistent, normocytic anemia with dark stools over last several weeks/months.   No hematochezia.  No N/V.  No jaundice.  No recent travel or sick contacts.  No change in bowel habits.  No dysphagia or odynophagia. No heavy NSAID use       Past Medical History:   Diagnosis Date    Anticoagulant long-term use     Diabetes mellitus     Encounter for blood transfusion     High cholesterol     Hypertension     Thyroid disease          Past Surgical History:   Procedure Laterality Date    APPENDECTOMY      CORONARY ARTERY BYPASS GRAFT         Social History  Social History   Substance Use Topics    Smoking status: Former Smoker     Start date: 1/8/2002    Smokeless tobacco: Not on file    Alcohol use No   No illicits    No FH colon cancer    Review of Systems  General ROS: negative for chills, fever or weight loss  Psychological ROS: negative for hallucination, depression or suicidal ideation  Ophthalmic ROS: negative for blurry vision, photophobia or eye pain  ENT ROS: negative for epistaxis, sore throat or rhinorrhea  Respiratory ROS: no cough, wheezing  Cardiovascular ROS: no chest pain or palpitations  Gastrointestinal ROS: no abdominal pain, change in bowel habits  Genito-Urinary ROS: no dysuria, trouble voiding, or hematuria  Musculoskeletal ROS: negative for gait disturbance or muscular weakness  Neurological ROS: no syncope or seizures; no ataxia  Dermatological ROS: negative for pruritis, rash and jaundice    Physical Examination  BP (!) 133/59   Pulse 62   Temp 97.8 °F (36.6 °C) (Oral)   Resp 18   Ht 5' 4" (1.626 m)   Wt 58.5 kg (129 lb)   SpO2 98%   Breastfeeding? No   BMI 22.14 kg/m²   General appearance: alert, cooperative, no distress  HENT: Normocephalic, atraumatic, neck symmetrical, no nasal discharge   Eyes: conjunctivae/corneas clear, PERRL, EOM's intact  Lungs: clear to auscultation " bilaterally, no dullness to percussion bilaterally  Heart: regular rate and rhythm without rub; no displacement of the PMI   Abdomen: soft, non-tender; bowel sounds normoactive; no organomegaly  Extremities: extremities symmetric; no clubbing, cyanosis, or edema  Integument: Skin color, texture, turgor normal; no rashes; hair distrubution normal  Neurologic: Alert and oriented X 3, MAEW  Psychiatric: no pressured speech; normal affect; no evidence of impaired cognition     Labs:  Hb low  MCV high normal    Assessment:     Anemia with dark stools over last weeks/months.  Family reports colonoscopy done in [place] in last months - apparently normal.    Plan:   - EGD today - further recs to follow

## 2017-06-09 NOTE — ASSESSMENT & PLAN NOTE
Awaiting A1c; Patient previously on metformin but states recently D/Cd by PCP secondary to hypoglycemia. Will cover with low dose SSI PRN, hypoglycemia protocol, POCT checks.

## 2017-06-09 NOTE — ASSESSMENT & PLAN NOTE
Initially mildly hypotensive on presentation and patient unclear what medications she is taking at home. Will hold for now and resume home medications as needed.

## 2017-06-09 NOTE — H&P
"[facility] Medicine  History & Physical    Patient Name: Radha Freedman  MRN: [id number]  Admission Date: 6/8/2017  Attending Physician: Paresh Craig MD   Primary Care Provider: Primary Doctor No         Patient information was obtained from patient and ER records.     Subjective:     Principal Problem:<principal problem not specified>    Chief Complaint:   Chief Complaint   Patient presents with    Fatigue     General fatigue, poor appetite X 3 days. Having hot flashes. Family states she is off her diabetic meds because it was lowering her blood sugar too much and her Dr. D/C'd it        HPI: Patient is 72 yo female with HTN, HLD, CAD s/p CABG (10 years ago), DM II, CKD?, thyroid disease who presents with complaint of fatigue and dark stools. History obtained with  but patient is poor historian and no family members at bedside. Per patient she has been experiencing about one year of progressively worsening fatigue, decreased appetite, and intermittent dark stools. Per patient she experienced three dark stools this week and had a similar episode a few months ago in [place] for which she received "2 bags of blood." She is unclear on if she has ever had a colonoscopy. She denies recent illness, fever/chills, abdominal pain, hematemesis, hematuria. She does not know much about her medical history or medications. She states she lives in [place] and is here visiting family with plans to return home soon. On presentation patient with hypotension and clinically dehydrated. Otherwise stable vitals. Grossly normal labs outside of hemoglobin of 7.6 (only down from 8.1 last year). Placed in observation for serial H/H and further monitoring.     Past Medical History:   Diagnosis Date    Anticoagulant long-term use     Diabetes mellitus     High cholesterol     Hypertension     Thyroid disease        History reviewed. No pertinent surgical history.    Review of patient's " allergies indicates:  No Known Allergies    No current facility-administered medications on file prior to encounter.      Current Outpatient Prescriptions on File Prior to Encounter   Medication Sig    amlodipine (NORVASC) 10 MG tablet Take 10 mg by mouth once daily.    aspirin (ECOTRIN) 81 MG EC tablet Take 81 mg by mouth once daily.    atorvastatin (LIPITOR) 40 MG tablet Take 40 mg by mouth once daily.    carvedilol (COREG) 6.25 MG tablet Take 6.25 mg by mouth 2 (two) times daily with meals.    clopidogrel (PLAVIX) 75 mg tablet Take 75 mg by mouth once daily.    famotidine (PEPCID) 20 MG tablet Take 20 mg by mouth once daily.     furosemide (LASIX) 20 MG tablet Take 1 tablet (20 mg total) by mouth once daily.    levothyroxine (SYNTHROID) 75 MCG tablet Take 75 mcg by mouth once daily.    lisinopril 10 MG tablet Take 10 mg by mouth once daily.    pioglitazone (ACTOS) 30 MG tablet Take 30 mg by mouth once daily.    potassium chloride (KLOR-CON) 20 mEq Pack Take 10 mEq by mouth once daily.     alendronate (FOSAMAX) 70 MG tablet Take 70 mg by mouth every 7 days.    fenofibrate (TRICOR) 145 MG tablet Take 145 mg by mouth once daily.    ferrous sulfate 325 mg (65 mg iron) Tab tablet Take 1 tablet (325 mg total) by mouth once daily.    gabapentin (NEURONTIN) 100 MG capsule Take 100 mg by mouth 3 (three) times daily.    metformin (GLUCOPHAGE) 850 MG tablet Take 850 mg by mouth 2 (two) times daily with meals.    pravastatin (PRAVACHOL) 80 MG tablet Take 80 mg by mouth once daily.    sennosides (SENNA) 8.6 mg Cap Take 1 capsule by mouth once daily.     Family History     None        Social History Main Topics    Smoking status: Unknown If Ever Smoked    Smokeless tobacco: Not on file    Alcohol use No    Drug use: Unknown    Sexual activity: Not on file     Review of Systems   Constitutional: Positive for appetite change and fatigue. Negative for chills and fever.   HENT: Negative for congestion and  sore throat.    Respiratory: Negative for cough and shortness of breath.    Cardiovascular: Negative for chest pain and palpitations.   Gastrointestinal: Positive for blood in stool. Negative for abdominal pain and nausea.   Endocrine: Negative for cold intolerance and heat intolerance.   Genitourinary: Negative for dysuria and frequency.   Musculoskeletal: Negative for arthralgias and myalgias.   Skin: Negative for color change and rash.   Neurological: Positive for weakness and light-headedness. Negative for dizziness and headaches.   Psychiatric/Behavioral: Negative for behavioral problems and confusion.     Objective:     Vital Signs (Most Recent):  Temp: 98 °F (36.7 °C) (06/08/17 1902)  Pulse: 72 (06/08/17 2102)  Resp: 16 (06/08/17 1902)  BP: (!) 120/59 (06/08/17 2102)  SpO2: 100 % (06/08/17 2102) Vital Signs (24h Range):  Temp:  [97.8 °F (36.6 °C)-98 °F (36.7 °C)] 98 °F (36.7 °C)  Pulse:  [70-84] 72  Resp:  [16] 16  SpO2:  [98 %-100 %] 100 %  BP: (103-137)/(53-64) 120/59     Weight: 54.4 kg (120 lb)  Body mass index is 20.6 kg/m².    Physical Exam   Constitutional: She is oriented to person, place, and time. She appears well-developed and well-nourished. No distress.   HENT:   Head: Normocephalic and atraumatic.   Eyes: EOM are normal. Pupils are equal, round, and reactive to light.   Pale conjunctiva   Neck: Normal range of motion. Neck supple.   Cardiovascular: Normal rate and regular rhythm.    No murmur heard.  Pulmonary/Chest: Effort normal and breath sounds normal.   Abdominal: Soft. Bowel sounds are normal. There is no tenderness.   Musculoskeletal: Normal range of motion.   Neurological: She is alert and oriented to person, place, and time.   Skin: Skin is warm and dry. No rash noted.   Psychiatric: She has a normal mood and affect. Her behavior is normal.        Significant Labs:   CBC:   Recent Labs  Lab 06/08/17  1700   WBC 3.94   HGB 7.6*   HCT 23.7*        CMP:   Recent Labs  Lab  06/08/17  1700      K 5.1      CO2 24   *   BUN 70*   CREATININE 2.2*   CALCIUM 10.0   PROT 7.5   ALBUMIN 4.0   BILITOT 0.3   ALKPHOS 25*   AST 22   ALT 14   ANIONGAP 8   EGFRNONAA 22*     Urine Studies:   Recent Labs  Lab 06/08/17  1800   COLORU Colorless*   APPEARANCEUA Clear   PHUR 5.0   SPECGRAV 1.010   PROTEINUA Negative   GLUCUA Negative   KETONESU Negative   BILIRUBINUA Negative   OCCULTUA Negative   NITRITE Negative   UROBILINOGEN Negative   LEUKOCYTESUR Negative   RBCUA 1           Assessment/Plan:     Anemia    Patient seems to have underlying chronic anemia --iron deficiency vs chronic disease? As she has iron pills in bag but she is unsure of this as well. Last year hemoglobin of 8.1 and today at 7.6 with reported chronic intermittent dark stools? She reports fatigue and SOB but currently vitals stable and no indication for transfusion. She does report having had 2 units of blood in [place] a few months ago. Will monitor with serial H/H and anemia workup pending.           Acute on chronic renal insufficiency    Per patient visit 1 year ago seems likely some baseline renal disease although patient is unsure of this. She seems to have acute insufficiency at this time noting creatinine of 2.2 on today and was 1.6 last year. Will provide IVF hydration and repeat BMP in am.         Type 2 diabetes mellitus without complication, without long-term current use of insulin    A1c unknown--will check. Patient previously on metformin but states recently D/Cd by PCP secondary to hypoglycemia. Will cover with low dose SSI PRN, hypoglycemia protocol, POCT checks.           HLD (hyperlipidemia)    Continue statin. Defer lipid panel to PCP.           Essential hypertension    Initially mildly hypotensive on presentation and patient unclear what medications she is taking at home. Will hold for now and resume home medications as needed.         CAD (coronary artery disease)    CAD s/p CABG. Currently  on ASA, plavix, statin it seems although patient unsure what medications she is actually taking. Continue.           Thyroid disease    TSH 2.6 on today. Continue synthroid.           Acute GI bleeding    Patient with what seems to be 1 year history of intermittent dark stools. She is not sure if she has had colonoscopy and states she saw a GI doctor but it was ~20 years ago. Will closely monitor H/H. Consult to GI. Of note, however, patient lives in [place] and here visiting family with plans to return soon--will likely need to establish care there.             VTE Risk Mitigation     None        Lyric Grissom PA-C  Hospitalist-Department of Hospital Medicine  [address] [place] [address]  Office [phone] Pager [phone]

## 2017-06-09 NOTE — TRANSFER OF CARE
"Anesthesia Transfer of Care Note    Patient: Radha Freedman    Procedure(s) Performed: Procedure(s) (LRB):  ESOPHAGOGASTRODUODENOSCOPY (EGD) (N/A)    Patient location: GI    Anesthesia Type: general    Transport from OR: Transported from OR on room air with adequate spontaneous ventilation    Post pain: adequate analgesia    Post assessment: no apparent anesthetic complications and tolerated procedure well    Post vital signs: stable    Level of consciousness: awake, alert and oriented    Nausea/Vomiting: no nausea/vomiting    Complications: none    Transfer of care protocol was followed      Last vitals:   Visit Vitals  BP (!) 142/62 (BP Location: Right arm, Patient Position: Lying, BP Method: Automatic)   Pulse 75   Temp 37.1 °C (98.8 °F) (Oral)   Resp 18   Ht 5' 4" (1.626 m)   Wt 58.5 kg (129 lb)   SpO2 100%   Breastfeeding? No   BMI 22.14 kg/m²     "

## 2017-06-09 NOTE — ASSESSMENT & PLAN NOTE
Patient with what seems to be 1 year history of intermittent dark stools. She is not sure if she has had colonoscopy and states she saw a GI doctor but it was ~20 years ago. Will closely monitor H/H. Consult to GI. Of note, however, patient lives in [place] and here visiting family with plans to return soon--will likely need to establish care there.

## 2017-06-09 NOTE — ASSESSMENT & PLAN NOTE
A1c unknown--will check. Patient previously on metformin but states recently D/Cd by PCP secondary to hypoglycemia. Will cover with low dose SSI PRN, hypoglycemia protocol, POCT checks.

## 2017-06-10 VITALS
SYSTOLIC BLOOD PRESSURE: 139 MMHG | WEIGHT: 123.81 LBS | TEMPERATURE: 98 F | DIASTOLIC BLOOD PRESSURE: 65 MMHG | OXYGEN SATURATION: 98 % | HEART RATE: 66 BPM | BODY MASS INDEX: 21.14 KG/M2 | HEIGHT: 64 IN | RESPIRATION RATE: 18 BRPM

## 2017-06-10 PROBLEM — N18.9 ACUTE ON CHRONIC RENAL INSUFFICIENCY: Status: RESOLVED | Noted: 2017-06-08 | Resolved: 2017-06-10

## 2017-06-10 PROBLEM — N28.9 ACUTE ON CHRONIC RENAL INSUFFICIENCY: Status: RESOLVED | Noted: 2017-06-08 | Resolved: 2017-06-10

## 2017-06-10 LAB
ALBUMIN SERPL BCP-MCNC: 3.3 G/DL
ALP SERPL-CCNC: 21 U/L
ALT SERPL W/O P-5'-P-CCNC: 13 U/L
ANION GAP SERPL CALC-SCNC: 5 MMOL/L
AST SERPL-CCNC: 23 U/L
BILIRUB SERPL-MCNC: 0.4 MG/DL
BLD PROD TYP BPU: NORMAL
BLOOD UNIT EXPIRATION DATE: NORMAL
BLOOD UNIT TYPE CODE: 5100
BLOOD UNIT TYPE: NORMAL
BUN SERPL-MCNC: 29 MG/DL
CALCIUM SERPL-MCNC: 8.3 MG/DL
CHLORIDE SERPL-SCNC: 112 MMOL/L
CO2 SERPL-SCNC: 23 MMOL/L
CODING SYSTEM: NORMAL
CREAT SERPL-MCNC: 1.5 MG/DL
DISPENSE STATUS: NORMAL
EST. GFR  (AFRICAN AMERICAN): 40 ML/MIN/1.73 M^2
EST. GFR  (NON AFRICAN AMERICAN): 35 ML/MIN/1.73 M^2
GLUCOSE SERPL-MCNC: 129 MG/DL
HCT VFR BLD AUTO: 35.1 %
HGB BLD-MCNC: 12 G/DL
INR PPP: 1
POCT GLUCOSE: 74 MG/DL (ref 70–110)
POTASSIUM SERPL-SCNC: 4.2 MMOL/L
PROT SERPL-MCNC: 6.2 G/DL
PROTHROMBIN TIME: 10.7 SEC
SODIUM SERPL-SCNC: 140 MMOL/L
TRANS ERYTHROCYTES VOL PATIENT: NORMAL ML

## 2017-06-10 PROCEDURE — C9113 INJ PANTOPRAZOLE SODIUM, VIA: HCPCS | Performed by: INTERNAL MEDICINE

## 2017-06-10 PROCEDURE — 85610 PROTHROMBIN TIME: CPT

## 2017-06-10 PROCEDURE — 96376 TX/PRO/DX INJ SAME DRUG ADON: CPT

## 2017-06-10 PROCEDURE — 85018 HEMOGLOBIN: CPT

## 2017-06-10 PROCEDURE — 80053 COMPREHEN METABOLIC PANEL: CPT

## 2017-06-10 PROCEDURE — 25000003 PHARM REV CODE 250: Performed by: PHYSICIAN ASSISTANT

## 2017-06-10 PROCEDURE — 96374 THER/PROPH/DIAG INJ IV PUSH: CPT

## 2017-06-10 PROCEDURE — 63600175 PHARM REV CODE 636 W HCPCS: Performed by: INTERNAL MEDICINE

## 2017-06-10 PROCEDURE — P9021 RED BLOOD CELLS UNIT: HCPCS

## 2017-06-10 PROCEDURE — 85014 HEMATOCRIT: CPT

## 2017-06-10 RX ORDER — PANTOPRAZOLE SODIUM 40 MG/1
40 TABLET, DELAYED RELEASE ORAL DAILY
Status: DISCONTINUED | OUTPATIENT
Start: 2017-06-11 | End: 2017-06-10 | Stop reason: HOSPADM

## 2017-06-10 RX ORDER — LISINOPRIL 2.5 MG/1
2.5 TABLET ORAL DAILY
Qty: 30 TABLET | Refills: 0 | Status: SHIPPED | OUTPATIENT
Start: 2017-06-10 | End: 2017-07-10

## 2017-06-10 RX ORDER — PANTOPRAZOLE SODIUM 40 MG/1
40 TABLET, DELAYED RELEASE ORAL DAILY
Qty: 30 TABLET | Refills: 0 | Status: SHIPPED | OUTPATIENT
Start: 2017-06-11 | End: 2017-07-11

## 2017-06-10 RX ADMIN — PANTOPRAZOLE SODIUM 40 MG: 40 INJECTION, POWDER, FOR SOLUTION INTRAVENOUS at 08:06

## 2017-06-10 RX ADMIN — SODIUM CHLORIDE: 0.9 INJECTION, SOLUTION INTRAVENOUS at 08:06

## 2017-06-10 RX ADMIN — ASPIRIN 81 MG: 81 TABLET, COATED ORAL at 08:06

## 2017-06-10 RX ADMIN — ATORVASTATIN CALCIUM 40 MG: 40 TABLET, FILM COATED ORAL at 08:06

## 2017-06-10 RX ADMIN — CARVEDILOL 6.25 MG: 6.25 TABLET, FILM COATED ORAL at 08:06

## 2017-06-10 RX ADMIN — FERROUS SULFATE TAB EC 325 MG (65 MG FE EQUIVALENT) 325 MG: 325 (65 FE) TABLET DELAYED RESPONSE at 08:06

## 2017-06-10 RX ADMIN — LEVOTHYROXINE SODIUM 75 MCG: 75 TABLET ORAL at 05:06

## 2017-06-10 NOTE — DISCHARGE SUMMARY
"[facility] Medicine  Discharge Summary      Patient Name: Radha Freedman  MRN: [id number]  Admission Date: 6/8/2017  Hospital Length of Stay: 1 days  Discharge Date and Time:  06/10/2017 10:48 AM  Attending Physician: Anamaria Roberts MD   Discharging Provider: Anamaria Roberts MD  Primary Care Provider: WALDO Sheppard      HPI:   Patient is 70 yo Croatian speaking female with HTN, HLD, CAD s/p CABG (10 years ago), DM II, CKD?, thyroid disease who presents with complaint of fatigue and dark stools. History obtained with  but patient is poor historian and no family members at bedside. Per patient she has been experiencing about one year of progressively worsening fatigue, decreased appetite, and intermittent dark stools. Per patient she experienced three dark stools this week and had a similar episode a few months ago in [place] for which she received "2 bags of blood." She is unclear on if she has ever had a colonoscopy. She denies recent illness, fever/chills, abdominal pain, hematemesis, hematuria. She does not know much about her medical history or medications. She states she lives in [place] and is here visiting family with plans to return home soon. On presentation patient with hypotension and clinically dehydrated. Otherwise stable vitals. Grossly normal labs outside of hemoglobin of 7.6 (only down from 8.1 last year). Placed in observation for serial H/H and further monitoring.     Procedure(s) (LRB):  ESOPHAGOGASTRODUODENOSCOPY (EGD) (N/A)      Indwelling Lines/Drains at time of discharge:   Lines/Drains/Airways          No matching active lines, drains, or airways        Hospital Course:   Admitted for weakness and worsening fatigue and found to be anemic with H/H trending downward 6.6/20.1 this morning. Patient consented via translation line for 3 units PRBC, risk and benefits discussed and agreed by patient. She tells me she is familiar with blood transfusions as " she has had many before but unsure of the date of the last transfusion.     Dc h/h 12/35  EGD findings: Impression:          - Normal esophagus.                       - Normal examined duodenum.                       - Gastric erosions without bleeding. Biopsied.    Pt instructed to start protonix daily.  Per son she plans to follow up with GI in [place], recommend pill endoscopy. Taken off aspirin and will continue plavix for CAD. Hold metformin. Lower lisinopril and dc norvasc. Changes reviewed with son.      Consults:   Consults         Status Ordering Provider     Inpatient consult to Gastroenterology  Once     Provider:  Max Bautista MD    Completed JACQUI HERNANDEZ            Pending Diagnostic Studies:     Procedure Component Value Units Date/Time    Hematocrit [[id number]]     Order Status:  Sent Lab Status:  No result     Specimen:  Blood from Blood     Hemoglobin [[id number]]     Order Status:  Sent Lab Status:  No result     Specimen:  Blood from Blood         Final Active Diagnoses:    Diagnosis Date Noted POA    PRINCIPAL PROBLEM:  Acute GI bleeding [K92.2] 06/08/2017 Yes    Thyroid disease [E07.9] 06/08/2017 Yes    CAD (coronary artery disease) [I25.10] 06/08/2017 Yes    Essential hypertension [I10] 06/08/2017 Yes    HLD (hyperlipidemia) [E78.5] 06/08/2017 Yes    Type 2 diabetes mellitus without complication, without long-term current use of insulin [E11.9] 06/08/2017 Yes    Anemia [D64.9] 06/08/2017 Yes      Problems Resolved During this Admission:    Diagnosis Date Noted Date Resolved POA    Acute on chronic renal insufficiency [N28.9, N18.9] 06/08/2017 06/10/2017 Yes      Anemia    Transfused and h/h stable for dc           Type 2 diabetes mellitus without complication, without long-term current use of insulin     Patient previously on metformin but states recently D/Cd by PCP secondary to hypoglycemia. Not sure if taking actos but can have hypoglycemia with plavix, needs to follow up with PCP  and monitor blood sugars closely at home            HLD (hyperlipidemia)    Continue statin. Defer lipid panel to PCP.           Essential hypertension    BP low-normal  Resume coreg, lower lisinopril  Dc norvasc  Follow up PCP/cardiology        CAD (coronary artery disease)    CAD s/p CABG. Currently on ASA, plavix, statin it seems although patient unsure what medications she is actually taking.  Aspirin dc'd  plavix resumed   Needs to follow up with Gi and cardiology          Thyroid disease    TSH 2.6 on today. Continue synthroid.           * Acute GI bleeding    H/h stable for dc home  Follow up GI   -iron daily              Discharged Condition: good    Disposition: Home or Self Care    Follow Up:  Follow-up Information     Schedule an appointment as soon as possible for a visit with WALDO Sheppard.    Why:  Hospital Follow Up  Contact information:  Jorge Ruiz  [address] [address]                 Patient Instructions:     Diet Diabetic 1800 Calories     Diet Cardiac     Activity as tolerated     Call MD for:  persistent nausea and vomiting or diarrhea     Call MD for:  persistent dizziness, light-headedness, or visual disturbances     Call MD for:  difficulty breathing or increased cough       Medications:  Reconciled Home Medications:   Current Discharge Medication List      START taking these medications    Details   pantoprazole (PROTONIX) 40 MG tablet Take 1 tablet (40 mg total) by mouth once daily.  Qty: 30 tablet, Refills: 0         CONTINUE these medications which have CHANGED    Details   lisinopril (PRINIVIL,ZESTRIL) 2.5 MG tablet Take 1 tablet (2.5 mg total) by mouth once daily.  Qty: 30 tablet, Refills: 0         CONTINUE these medications which have NOT CHANGED    Details   atorvastatin (LIPITOR) 40 MG tablet Take 40 mg by mouth once daily.      carvedilol (COREG) 6.25 MG tablet Take 6.25 mg by mouth 2 (two) times daily with meals.      clopidogrel (PLAVIX) 75 mg tablet Take 75 mg by  mouth once daily.      levothyroxine (SYNTHROID) 75 MCG tablet Take 75 mcg by mouth once daily.      linaclotide (LINZESS) 145 mcg Cap capsule Take 145 mcg by mouth once daily.      pioglitazone (ACTOS) 30 MG tablet Take 30 mg by mouth once daily.      pregabalin (LYRICA) 150 MG capsule Take 150 mg by mouth 2 (two) times daily.      alendronate (FOSAMAX) 70 MG tablet Take 70 mg by mouth every 7 days.      fenofibrate (TRICOR) 145 MG tablet Take 145 mg by mouth once daily.      ferrous sulfate 325 mg (65 mg iron) Tab tablet Take 1 tablet (325 mg total) by mouth once daily.  Qty: 30 tablet, Refills: 2      sennosides (SENNA) 8.6 mg Cap Take 1 capsule by mouth once daily.  Qty: 30 each, Refills: 2         STOP taking these medications       amlodipine (NORVASC) 10 MG tablet Comments:   Reason for Stopping:         aspirin (ECOTRIN) 81 MG EC tablet Comments:   Reason for Stopping:         famotidine (PEPCID) 20 MG tablet Comments:   Reason for Stopping:         furosemide (LASIX) 20 MG tablet Comments:   Reason for Stopping:         metformin (GLUCOPHAGE) 850 MG tablet Comments:   Reason for Stopping:         potassium chloride (KLOR-CON) 20 mEq Pack Comments:   Reason for Stopping:         pravastatin (PRAVACHOL) 80 MG tablet Comments:   Reason for Stopping:             Time spent on the discharge of patient: 30 minutes    Anamaria Roberts MD  Department of Hospital Medicine  [facility]

## 2017-06-10 NOTE — PROGRESS NOTES
Patient given discharge instructions and son at bedside and verbalized acknowledgement stated understood medications and importannce of making a follow up appointment.

## 2017-06-10 NOTE — PROGRESS NOTES
Third unit of Blood transfusion being administered at this time. VSS and pt is resting quietly in bed. Will continue to monitor.

## 2017-06-10 NOTE — PLAN OF CARE
06/10/17 1031   Final Note   Assessment Type Final Discharge Note   Discharge Disposition Home   Discharge planning education complete? Yes   What phone number can be called within the next 1-3 days to see how you are doing after discharge? [phone]   Hospital Follow Up  Appt(s) scheduled? No   Discharge plans and expectations educations in teach back method with documentation complete? Yes   Offered [facility] -- Bedside Delivery? n/a   Discharge/Hospital Encounter Summary to ([facility]) PCP Yes   Referral to Outpatient Case Management complete? n/a   Referral to / orders for Home Health Complete? n/a   30 day supply of medicines given at discharge, if documented non-compliance / non-adherence? n/a   Any social issues identified prior to discharge? No   Did you assess the readiness or willingness of the family or caregiver to support self management of care? Yes   Right Care Referral Info   Post Acute Recommendation No Care     Follow-up Information     Schedule an appointment as soon as possible for a visit with WALDO Sheppard.    Why:  Hospital Follow Up  Contact information:  Jorge Ruiz  [address] [address]

## 2017-06-10 NOTE — PROGRESS NOTES
Report received from Yojana ZUNIGA. Nursing rounds completed. Assessed pt's general appearance/condition.  Patient resting quietly in bed, respirations even/unlabored, no signs of distress noted, pt denies pain at this time.  Blood transfusion being administered at this time. Will continue to monitor.

## 2017-06-10 NOTE — ASSESSMENT & PLAN NOTE
CAD s/p CABG. Currently on ASA, plavix, statin it seems although patient unsure what medications she is actually taking.  Aspirin dc'd  plavix resumed   Needs to follow up with Gi and cardiology

## 2017-06-10 NOTE — ASSESSMENT & PLAN NOTE
Patient previously on metformin but states recently D/Cd by PCP secondary to hypoglycemia. Not sure if taking actos but can have hypoglycemia with plavix, needs to follow up with PCP and monitor blood sugars closely at home

## 2017-06-10 NOTE — PLAN OF CARE
Problem: Fall Risk (Adult)  Goal: Absence of Falls  Patient will demonstrate the desired outcomes by discharge/transition of care.   Outcome: Ongoing (interventions implemented as appropriate)   06/10/17 0124   Fall Risk (Adult)   Absence of Falls making progress toward outcome   Pt ambulated to the bathroom with a steady gait. She has non slip socks on with bed in lowest position, side rails up, and call light in reach    Problem: Patient Care Overview  Goal: Plan of Care Review  Outcome: Ongoing (interventions implemented as appropriate)   06/10/17 0124   Coping/Psychosocial   Plan Of Care Reviewed With patient   Pt is Turkish speaking only. She is calm and cooperative. She has her son at bedside for translation. Pt is aware that she is receiving blood at this time.     Problem: Diabetes, Type 2 (Adult)  Goal: Signs and Symptoms of Listed Potential Problems Will be Absent, Minimized or Managed (Diabetes, Type 2)  Signs and symptoms of listed potential problems will be absent, minimized or managed by discharge/transition of care (reference Diabetes, Type 2 (Adult) CPG).   Outcome: Ongoing (interventions implemented as appropriate)   06/10/17 0124   Diabetes, Type 2   Problems Assessed (Type 2 Diabetes) all   Problems Present (Type 2 Diabetes) hyperglycemia       Problem: Gastrointestinal Bleeding (Adult)  Goal: Signs and Symptoms of Listed Potential Problems Will be Absent, Minimized or Managed (Gastrointestinal Bleeding)  Signs and symptoms of listed potential problems will be absent, minimized or managed by discharge/transition of care (reference Gastrointestinal Bleeding (Adult) CPG).   Outcome: Ongoing (interventions implemented as appropriate)   06/10/17 0124   Gastrointestinal Bleeding   Problems Assessed (GI Bleeding) all   Problems Present (GI Bleeding) none

## 2017-06-12 NOTE — ANESTHESIA POSTPROCEDURE EVALUATION
"Anesthesia Post Evaluation    Patient: Radha Freedman    Procedure(s) Performed: Procedure(s) (LRB):  ESOPHAGOGASTRODUODENOSCOPY (EGD) (N/A)    Final Anesthesia Type: general  Patient location during evaluation: GI PACU  Patient participation: Yes- Able to Participate  Level of consciousness: awake and alert, awake and oriented  Post-procedure vital signs: reviewed and stable  Pain management: adequate  Airway patency: patent  PONV status at discharge: No PONV  Anesthetic complications: no      Cardiovascular status: blood pressure returned to baseline and hemodynamically stable  Respiratory status: unassisted, spontaneous ventilation and room air  Hydration status: euvolemic  Follow-up not needed.        Visit Vitals  /65   Pulse 66   Temp 36.8 °C (98.2 °F) (Oral)   Resp 18   Ht 5' 4" (1.626 m)   Wt 56.2 kg (123 lb 12.8 oz)   SpO2 98%   Breastfeeding? No   BMI 21.25 kg/m²       Pain/Anila Score: No Data Recorded      "

## 2017-06-13 NOTE — PHYSICIAN QUERY
"PT Name: Radha Freedman  MR #: [id number]     Physician Query Form - Etiology of Condition Clarification      CDS/: Camila Talamantes RN, CCDS              Contact information: [phone]   This form is a permanent document in the medical record.     Query Date: June 13, 2017    By submitting this query, we are merely seeking further clarification of documentation.  Please utilize your independent clinical judgment when addressing the question(s) below.     The medical record contains the following:    Findings Supporting Clinical Information Location in Medical Record   Acute Upper GI Bleeding    H/H stable for dc home.  Follow up GI  Iron daily.    -- DC Summary Grossly normal labs outside of hemoglobin of 7.6 (only down from 8.1 last year).     Per patient she experienced three dark stools this week and had a similar episode a few months ago in [place] for which she received "2 bags of blood."     Placed in observation for serial H/H and further monitoring.     EGD findings: Impression:      - Normal esophagus.   - Normal examined duodenum.   - Gastric erosions without bleeding. Biopsied    Pt instructed to start protonix daily.    Per son she plans to follow up with GI in [place], recommend pill endoscopy.     Taken off aspirin and will continue plavix for CAD. Hold metformin. Lower lisinopril and dc norvasc. Changes reviewed with son.      EGD Report:   Iron deficiency anemia secondary to chronic blood loss                    6/8  17:00 6/8 23:43 6/9  05:18 6/9 09:37 6/9  22:36 6/10  06:25   Hgb 7.6 (L) 7.2 (L) 7.1 (L) 6.6 (L) 10.5 (L) 12.0   Hct 23.7 (L) 22.4 (L) 21.9 (L) 20.1 (L) 30.9 (L) 35.1 (L)          Blood Bank: Transfused 3 units PRBC on 6/8 DC Summary                                            EGD Report.          Lab                   Please document your best medical opinion regarding the etiology of "Acute Upper GI Bleeding"  for which the primary focus of treatment is/was directed.       gastrci " erosions,                  [    ]  Clinically Undetermined    Please document in your progress notes daily for the duration of treatment, until resolved, and include in your discharge summary.

## 2017-06-13 NOTE — PHYSICIAN QUERY
PT Name: Radha Freedman  MR #: [id number]  Physician Query Form - CKD Clarification     CDS/: Camila Talamantes RN, CCDS          Contact information: [phone]  This form is a permanent document in the medical record.     Query Date: June 13, 2017    By submitting this query, we are merely seeking further clarification of documentation. Please utilize your independent clinical judgment when addressing the question(s) below.    The Medical record contains the following:     Indicators   Supporting Clinical Findings   Location in Medical Record   x CKD or Chronic Kidney (Renal) Failure / Disease Chronic renal failure    Chronic renal insufficency ED MD Note    DC Summary     x BUN/Creatinine                          GFR  6/8/2017 17:00 6/9/2017 05:19 6/10/2017 06:25   BUN, Bld 70 (H) 51 (H) 29 (H)   Creatinine 2.2 (H) 1.8 (H) 1.5 (H)   gfr 22 (A) 28 (A) 35 (A)    Lab             x Dehydration Dehydration DC Summary   x Nausea / Vomiting Nausea DC Summary    Dialysis / CRRT      Medication      Treatment     x Other Chronic Conditions HTN  CAD  S/p cabg  ? CKD   DC Summary    Other       Provider, please further specify the stage of CKD.      [  ] Chronic Kidney Disease (CKD) (please specify stage* below)       [employer] Definitions  Stage Description  eGFR (mL/min)   [  ]  I Slight kidney damage with normal or increased filtration 90+   [  ]  II Mildly reduced kidney function 60-89   [ x ]  III Moderately reduced kidney function 30-59   [  ] IV Severely reduced kidney function 15-29   [  ]  V Kidney failure, requiring transplant or dialysis <15     [  ] CKD ruled out    [  ] Other (please specify): ________________________    [  ] Clinically Undetermined    Please document in your progress notes daily for the duration of treatment until resolved and include in your discharge summary.

## 2017-06-13 NOTE — PHYSICIAN QUERY
PT Name: Radha Freedman  MR #: [id number]     Physician Query Form - Documentation Clarification      CDS/: Camila Talamantes RN, CCDS              Contact information: [phone]    This form is a permanent document in the medical record.     Query Date: June 13, 2017    By submitting this query, we are merely seeking further clarification of documentation. Please utilize your independent clinical judgment when addressing the question(s) below.    The Medical record reflects the following:    Supporting Clinical Findings Location in Medical Record   Acute Upper GI Bleed  Acute on Chronic renal failure    Nausea  Fatigue  appetite change.   ED MD Note   Dehydrated  Hypotension  Transfused 3 units prbc     Acute on chronic renal insufficiency      HTN  CAD  S/p cabg  ? CKD   DC Summary   IVF:    1 Liter IV NS bolus in ED x1  IVF NS @ 100 ml/hr on 6/8 - 10       6/8/2017 17:00 6/9/2017 05:19 6/10/2017 06:25   BUN, Bld 70 (H) 51 (H) 29 (H)   Creatinine 2.2 (H) 1.8 (H) 1.5 (H)   gfr 22 (A) 28 (A) 35 (A)      MAR              Lab                                                                            Doctor, Please specify diagnosis or diagnoses associated with above clinical findings.    Provider Use Only    (  ) Acute Renal Failure    (  ) Acute Renal Insufficiency    (x  ) Other diagnosis: __acute on chronic renal failure CKD 3_____________  (  ) Undeterminable                                                                                                                           [  ] Clinically undetermined

## 2022-07-28 NOTE — PROGRESS NOTES
"[facility] Hospital Medicine  Progress Note    Patient Name: Radha Freedman  MRN: [id number]  Patient Class: IP- Inpatient   Admission Date: 6/8/2017  Length of Stay: 0 days  Attending Physician: Paresh Craig MD  Primary Care Provider: WALDO Sheppard        Subjective:     Principal Problem:Acute GI bleeding    HPI:  Patient is 72 yo Croatian speaking female with HTN, HLD, CAD s/p CABG (10 years ago), DM II, CKD?, thyroid disease who presents with complaint of fatigue and dark stools. History obtained with  but patient is poor historian and no family members at bedside. Per patient she has been experiencing about one year of progressively worsening fatigue, decreased appetite, and intermittent dark stools. Per patient she experienced three dark stools this week and had a similar episode a few months ago in [place] for which she received "2 bags of blood." She is unclear on if she has ever had a colonoscopy. She denies recent illness, fever/chills, abdominal pain, hematemesis, hematuria. She does not know much about her medical history or medications. She states she lives in [place] and is here visiting family with plans to return home soon. On presentation patient with hypotension and clinically dehydrated. Otherwise stable vitals. Grossly normal labs outside of hemoglobin of 7.6 (only down from 8.1 last year). Placed in observation for serial H/H and further monitoring.     Hospital Course:  Admitted for weakness and worsening fatigue and found to be anemic with H/H trending downward 6.6/20.1 this morning. Patient consented via translation line for 3 units PRBC, risk and benefits discussed and agreed by patient. She tells me she is familiar with blood transfusions as she has had many before but unsure of the date of the last transfusion. GI is consulted and aware that the patient is admitted; Continue NPO status until see by GI.    Interval History: Patient weak, " PA NEEDED FOR   METHYLPHENIDATE HCL 20 MG TABLETS    KEY BDGLMTUE    GIVEN TO TRIAGE fatigued appearing, tells me she has had transfusions before; endorses that she does not eat meat or much with iron nutritionally; denies overt bleeding, denies dark stools, denies vaginal bleeding, or NV    Review of Systems   Constitutional: Positive for appetite change and fatigue. Negative for chills, diaphoresis and fever.   Respiratory: Positive for shortness of breath. Negative for cough, chest tightness and wheezing.    Cardiovascular: Negative for chest pain, palpitations and leg swelling.   Gastrointestinal: Negative for abdominal distention, abdominal pain, constipation, diarrhea, nausea and vomiting.   Genitourinary: Negative for dysuria and hematuria.   Musculoskeletal: Negative for joint swelling and neck stiffness.   Neurological: Positive for dizziness and weakness.     Objective:     Vital Signs (Most Recent):  Temp: 98 °F (36.7 °C) (06/09/17 1130)  Pulse: 66 (06/09/17 1130)  Resp: 17 (06/09/17 1130)  BP: (!) 138/56 (06/09/17 1130)  SpO2: 98 % (06/09/17 1130) Vital Signs (24h Range):  Temp:  [97.7 °F (36.5 °C)-98 °F (36.7 °C)] 98 °F (36.7 °C)  Pulse:  [65-84] 66  Resp:  [16-17] 17  SpO2:  [97 %-100 %] 98 %  BP: (103-148)/(50-64) 138/56     Weight: 58.5 kg (129 lb)  Body mass index is 22.14 kg/m².  No intake or output data in the 24 hours ending 06/09/17 1207   Physical Exam   Constitutional: She is oriented to person, place, and time. She appears well-developed and well-nourished. No distress.   Weak and dehydrated appearing; denies recent weight changes;   HENT:   Head: Normocephalic and atraumatic.   Pale conjunctiva   Eyes: EOM are normal. Pupils are equal, round, and reactive to light. Right eye exhibits no discharge. Left eye exhibits no discharge. No scleral icterus.   Cardiovascular: Normal rate and regular rhythm.    Sluggish cap refill   Pulmonary/Chest: Effort normal. No respiratory distress. She has no wheezes. She has no rales.   Abdominal: Soft. She exhibits no distension. There is no  tenderness.   Genitourinary: Rectal exam shows guaiac negative stool.   Musculoskeletal: Normal range of motion. She exhibits no edema or tenderness.   Neurological: She is alert and oriented to person, place, and time.   Skin: Skin is warm and dry. She is not diaphoretic.   Psychiatric:   Irish speaking       Significant Labs:   CBC:   Recent Labs  Lab 06/08/17  1700 06/08/17  2343 06/09/17  0518 06/09/17  0937   WBC 3.94  --  3.06*  --    HGB 7.6* 7.2* 7.1* 6.6*   HCT 23.7* 22.4* 21.9* 20.1*     --  269  --      CMP:   Recent Labs  Lab 06/08/17  1700 06/09/17  0519    142   K 5.1 4.1    111*   CO2 24 24   * 91   BUN 70* 51*   CREATININE 2.2* 1.8*   CALCIUM 10.0 8.7   PROT 7.5 6.0   ALBUMIN 4.0 3.3*   BILITOT 0.3 0.3   ALKPHOS 25* 18*   AST 22 17   ALT 14 11   ANIONGAP 8 7*   EGFRNONAA 22* 28*     Lactic Acid: No results for input(s): LACTATE in the last 48 hours.  Lipase:   Recent Labs  Lab 06/08/17  1700   LIPASE 75*     Lipid Panel: No results for input(s): CHOL, HDL, LDLCALC, TRIG, CHOLHDL in the last 48 hours.  Troponin:   Recent Labs  Lab 06/08/17  1700 06/08/17  2343 06/09/17  0519   TROPONINI 0.012 0.009 0.011     TSH:   Recent Labs  Lab 06/08/17  1800   TSH 2.696     Urine Culture: No results for input(s): LABURIN in the last 48 hours.    Magnesium:  No results for input(s): MG in the last 48 hours.    Significant Imaging:   Imaging Results          X-Ray Chest PA And Lateral (Final result)  Result time 06/08/17 16:59:33    Final result by Sven Sloan MD (06/08/17 16:59:33)                 Impression:     stable chest.      Electronically signed by: RONDA SLOAN MD  Date:     06/08/17  Time:    16:59              Narrative:    2 views of chest.  Comparison 2015.  Postop sternotomy.  Heart normal size.  Prominent epicardial fat pads stable.  Postop cholecystectomy.  Lungs clear.                                Assessment/Plan:      * Acute GI bleeding     -Monitor serial H/H.   -Awaiting Consult to GI  -Transfuse 3 units PRBC  -IV fluids  -iron daily          CAD (coronary artery disease)    CAD s/p CABG. Currently on ASA, plavix, statin it seems although patient unsure what medications she is actually taking.  -Continue ASA & Plavix and defer to GI          Type 2 diabetes mellitus without complication, without long-term current use of insulin    Awaiting A1c; Patient previously on metformin but states recently D/Cd by PCP secondary to hypoglycemia. Will cover with low dose SSI PRN, hypoglycemia protocol, POCT checks.           Essential hypertension    BP stabilizing will continue NPO until seen by GI then restart home medication with caution        Thyroid disease    TSH 2.6 on today. Continue synthroid.           HLD (hyperlipidemia)    Continue statin. Defer lipid panel to PCP.           Anemia    Patient seems to have underlying chronic anemia --iron deficiency vs chronic disease? As she has iron pills in bag but she is unsure of this as well. Last year hemoglobin of 8.1 and today at 7.6 with reported chronic intermittent dark stools? She reports fatigue and SOB but currently vitals stable and no indication for transfusion. She does report having had 2 units of blood in [place] a few months ago. Will monitor with serial H/H and anemia workup pending.           Acute on chronic renal insufficiency    Per patient visit 1 year ago seems likely some baseline renal disease although patient is unsure of this. She seems to have acute insufficiency at this time noting creatinine of 2.2 on today and was 1.6 last year. Will provide IVF hydration and repeat BMP in am.           VTE Risk Mitigation         Ordered     Medium Risk of VTE  Once      06/08/17 2225     Place sequential compression device  Until discontinued      06/08/17 2225     Place KM hose  Until discontinued      06/08/17 2225              JORGE ALBERTO Granda, FNP-C  PA# [id number]  NPI#  [id number]  Hospitalist - Department of Hospital Medicine  [address] [place] [address]  Office [phone]; Pager [phone]

## 2022-08-24 NOTE — ED NOTES
Patient placed on continuous cardiac monitor, automatic blood pressure cuff and continuous pulse oximeter.   [employer] dose 1 and 2